# Patient Record
Sex: MALE | Race: WHITE | NOT HISPANIC OR LATINO | Employment: FULL TIME | URBAN - METROPOLITAN AREA
[De-identification: names, ages, dates, MRNs, and addresses within clinical notes are randomized per-mention and may not be internally consistent; named-entity substitution may affect disease eponyms.]

---

## 2017-11-03 ENCOUNTER — HOSPITAL ENCOUNTER (OUTPATIENT)
Dept: URGENT CARE | Facility: CLINIC | Age: 42
Setting detail: SPECIMEN
Discharge: HOME | End: 2017-11-03
Attending: EMERGENCY MEDICINE | Admitting: EMERGENCY MEDICINE

## 2017-11-03 LAB
BASOPHILS # BLD AUTO: 0 10*3/UL (ref 0–0.2)
BASOPHILS NFR BLD AUTO: 1 % (ref 0–2)
DIFFERENTIAL METHOD BLD: (no result)
EOSINOPHIL # BLD AUTO: 0.1 10*3/UL (ref 0–0.3)
EOSINOPHIL # BLD AUTO: 2 % (ref 0–3)
ERYTHROCYTE [DISTWIDTH] IN BLOOD BY AUTOMATED COUNT: 13.1 % (ref 11.5–14.5)
HCT VFR BLD AUTO: 51.3 % (ref 40–54)
HGB BLD-MCNC: 17.5 G/DL (ref 14–18)
LYMPHOCYTES # BLD AUTO: 1.1 10*3/UL (ref 0.8–4.8)
LYMPHOCYTES NFR BLD AUTO: 15 % (ref 18–42)
MCH RBC QN AUTO: 31.2 PG (ref 26–32)
MCHC RBC AUTO-ENTMCNC: 34 G/DL (ref 32–36)
MCV RBC AUTO: 91.8 FL (ref 80–94)
MONOCYTES # BLD AUTO: 0.8 10*3/UL (ref 0.1–1.3)
MONOCYTES NFR BLD AUTO: 10 % (ref 2–11)
NEUTROPHILS # BLD AUTO: 5.7 10*3/UL (ref 2.3–8.6)
NEUTROPHILS NFR BLD AUTO: 72 % (ref 50–75)
NRBC BLD AUTO-RTO: 0 /100{WBCS}
NRBC/RBC NFR BLD MANUAL: 0 10*3/UL
PLATELET # BLD AUTO: 142 10*3/UL (ref 150–450)
PMV BLD AUTO: 8.7 FL (ref 7.4–10.4)
RBC # BLD AUTO: 5.59 10*6/UL (ref 4.6–6)
WBC # BLD AUTO: 7.8 10*3/UL (ref 4.5–11.5)

## 2022-12-30 ENCOUNTER — APPOINTMENT (OUTPATIENT)
Dept: CT IMAGING | Facility: HOSPITAL | Age: 47
End: 2022-12-30
Payer: COMMERCIAL

## 2022-12-30 ENCOUNTER — HOSPITAL ENCOUNTER (EMERGENCY)
Facility: HOSPITAL | Age: 47
Discharge: HOME | End: 2022-12-30
Attending: EMERGENCY MEDICINE | Admitting: EMERGENCY MEDICINE
Payer: COMMERCIAL

## 2022-12-30 VITALS
HEIGHT: 71 IN | RESPIRATION RATE: 18 BRPM | TEMPERATURE: 97.8 F | WEIGHT: 195 LBS | OXYGEN SATURATION: 96 % | HEART RATE: 79 BPM | DIASTOLIC BLOOD PRESSURE: 75 MMHG | SYSTOLIC BLOOD PRESSURE: 122 MMHG | BODY MASS INDEX: 27.3 KG/M2

## 2022-12-30 DIAGNOSIS — K76.0 FATTY LIVER: ICD-10-CM

## 2022-12-30 DIAGNOSIS — N20.0 RENAL STONE: ICD-10-CM

## 2022-12-30 DIAGNOSIS — S32.009A CLOSED FRACTURE OF TRANSVERSE PROCESS OF LUMBAR VERTEBRA, INITIAL ENCOUNTER: Primary | ICD-10-CM

## 2022-12-30 LAB
ANION GAP SERPL CALCULATED.3IONS-SCNC: 8.2 MMOL/L (ref 5–15)
BASOPHILS # BLD AUTO: 0.02 10*3/MM3 (ref 0–0.2)
BASOPHILS NFR BLD AUTO: 0.3 % (ref 0–1.5)
BILIRUB UR QL STRIP: NEGATIVE
BUN SERPL-MCNC: 19 MG/DL (ref 6–20)
BUN/CREAT SERPL: 15.8 (ref 7–25)
CALCIUM SPEC-SCNC: 8.8 MG/DL (ref 8.6–10.5)
CHLORIDE SERPL-SCNC: 109 MMOL/L (ref 98–107)
CLARITY UR: CLEAR
CO2 SERPL-SCNC: 26.8 MMOL/L (ref 22–29)
COLOR UR: YELLOW
CREAT SERPL-MCNC: 1.2 MG/DL (ref 0.76–1.27)
DEPRECATED RDW RBC AUTO: 45.1 FL (ref 37–54)
EGFRCR SERPLBLD CKD-EPI 2021: 74.6 ML/MIN/1.73
EOSINOPHIL # BLD AUTO: 0.06 10*3/MM3 (ref 0–0.4)
EOSINOPHIL NFR BLD AUTO: 0.8 % (ref 0.3–6.2)
ERYTHROCYTE [DISTWIDTH] IN BLOOD BY AUTOMATED COUNT: 13.7 % (ref 12.3–15.4)
GLUCOSE SERPL-MCNC: 104 MG/DL (ref 65–99)
GLUCOSE UR STRIP-MCNC: NEGATIVE MG/DL
HCT VFR BLD AUTO: 45.1 % (ref 37.5–51)
HGB BLD-MCNC: 15.6 G/DL (ref 13–17.7)
HGB UR QL STRIP.AUTO: NEGATIVE
KETONES UR QL STRIP: NEGATIVE
LEUKOCYTE ESTERASE UR QL STRIP.AUTO: NEGATIVE
LYMPHOCYTES # BLD AUTO: 1.8 10*3/MM3 (ref 0.7–3.1)
LYMPHOCYTES NFR BLD AUTO: 24.5 % (ref 19.6–45.3)
MCH RBC QN AUTO: 31.3 PG (ref 26.6–33)
MCHC RBC AUTO-ENTMCNC: 34.6 G/DL (ref 31.5–35.7)
MCV RBC AUTO: 90.6 FL (ref 79–97)
MONOCYTES # BLD AUTO: 0.6 10*3/MM3 (ref 0.1–0.9)
MONOCYTES NFR BLD AUTO: 8.2 % (ref 5–12)
NEUTROPHILS NFR BLD AUTO: 4.76 10*3/MM3 (ref 1.7–7)
NEUTROPHILS NFR BLD AUTO: 64.7 % (ref 42.7–76)
NITRITE UR QL STRIP: NEGATIVE
PH UR STRIP.AUTO: 6 [PH] (ref 5–8)
PLATELET # BLD AUTO: 114 10*3/MM3 (ref 140–450)
PMV BLD AUTO: 10.7 FL (ref 6–12)
POTASSIUM SERPL-SCNC: 3.9 MMOL/L (ref 3.5–5.2)
PROT UR QL STRIP: NEGATIVE
RBC # BLD AUTO: 4.98 10*6/MM3 (ref 4.14–5.8)
SODIUM SERPL-SCNC: 144 MMOL/L (ref 136–145)
SP GR UR STRIP: 1.02 (ref 1–1.03)
UROBILINOGEN UR QL STRIP: NORMAL
WBC NRBC COR # BLD: 7.35 10*3/MM3 (ref 3.4–10.8)

## 2022-12-30 PROCEDURE — 25010000002 IOPAMIDOL 61 % SOLUTION: Performed by: PHYSICIAN ASSISTANT

## 2022-12-30 PROCEDURE — 85025 COMPLETE CBC W/AUTO DIFF WBC: CPT | Performed by: PHYSICIAN ASSISTANT

## 2022-12-30 PROCEDURE — 80048 BASIC METABOLIC PNL TOTAL CA: CPT | Performed by: PHYSICIAN ASSISTANT

## 2022-12-30 PROCEDURE — 74177 CT ABD & PELVIS W/CONTRAST: CPT

## 2022-12-30 PROCEDURE — 81003 URINALYSIS AUTO W/O SCOPE: CPT | Performed by: PHYSICIAN ASSISTANT

## 2022-12-30 PROCEDURE — 99284 EMERGENCY DEPT VISIT MOD MDM: CPT

## 2022-12-30 PROCEDURE — 99283 EMERGENCY DEPT VISIT LOW MDM: CPT

## 2022-12-30 RX ORDER — HYDROCODONE BITARTRATE AND ACETAMINOPHEN 5; 325 MG/1; MG/1
1 TABLET ORAL ONCE
Status: COMPLETED | OUTPATIENT
Start: 2022-12-30 | End: 2022-12-30

## 2022-12-30 RX ORDER — LIDOCAINE 50 MG/G
1 PATCH TOPICAL EVERY 24 HOURS
Qty: 30 PATCH | Refills: 0 | Status: SHIPPED | OUTPATIENT
Start: 2022-12-30

## 2022-12-30 RX ORDER — IBUPROFEN 600 MG/1
600 TABLET ORAL EVERY 6 HOURS PRN
Qty: 30 TABLET | Refills: 0 | Status: SHIPPED | OUTPATIENT
Start: 2022-12-30

## 2022-12-30 RX ORDER — HYDROCODONE BITARTRATE AND ACETAMINOPHEN 5; 325 MG/1; MG/1
1 TABLET ORAL EVERY 6 HOURS PRN
Qty: 12 TABLET | Refills: 0 | Status: SHIPPED | OUTPATIENT
Start: 2022-12-30 | End: 2023-01-10

## 2022-12-30 RX ADMIN — HYDROCODONE BITARTRATE AND ACETAMINOPHEN 1 TABLET: 5; 325 TABLET ORAL at 10:47

## 2022-12-30 RX ADMIN — IOPAMIDOL 85 ML: 612 INJECTION, SOLUTION INTRAVENOUS at 11:26

## 2022-12-30 NOTE — DISCHARGE INSTRUCTIONS
You have been given emergency department evaluation.  This evaluation is intended to rule out life-threatening conditions.  Is not a complete evaluation.  You could require further testing as determined by your primary care physician or any referred specialist.  Please follow-up with all doctors that you are referred to.  Please be sure to take your prescribed medications and follow any specific instructions in the discharge instructions.  Please follow-up with your primary care physician within 48 hours.  Please have your primary care provider recheck your blood pressure.  Please return to the emergency department if you experience chest pain, shortness of breath, abdominal pain, fever greater than 102, intractable vomiting.  Please return to the emergency department if your symptoms continue or worsen, or if you begin to experience any other concerning symptom.

## 2022-12-30 NOTE — ED PROVIDER NOTES
MD ATTESTATION NOTE  I wore full protective equipment throughout this patient encounter including an N95 face mask, googles, gown and gloves. Hand hygiene was performed before donning protective equipment and after removal when leaving the room.    The MONRO[facility] and I have discussed this patient's history, physical exam, and treatment plan. I have reviewed the documentation and personally had a face to face interaction with the patient. I affirm the [facility]RO[facility] documentation and agree with their diagnostics, findings, treatment, plan, and disposition.    I provided a substantive portion of the care of this patient.  I personally performed the physical exam, in its entirety.  The attached note describes my personal findings.    Alberto Hidalgo is a 47 y.o. male who presents to the ED c/o back pain.  Patient reports that he fell while walking down the steps Tuesday, landed on his back.  Patient reports that he was having upper and lower back pain but upper back pain has since resolved.  Patient complains of sharp well localized sharp back pain in his lumbar region, greater on the left side.  Patient reports that he was having some radiation into his leg but after being seen at urgent care and prescribed muscle relaxers as well as receiving a steroid shot radicular pain has resolved.  Patient reports pain is worse with movement, improved with rest.  Patient reports pain is worsened with sitting position.  Patient denies any radicular pain recently, denies any weakness or numbness.    On exam:  General: NAD.  Head: NCAT.  ENT: nares patent, no scleral icterus  Neck: Supple, trachea midline.  Cardiac: regular rate and rhythm.  Lungs: normal effort.  Abdomen: Soft, NTTP.   Lumbar spine: No step-offs or deformities, no midline tenderness to palpation, positive bilateral paraspinal tenderness much greater on the left in the mid lumbar region.  Bilateral lower extremities: Negative straight leg raise.  5 out of 5 strength.  Sensation  intact light touch.  2+ reflexes. No clonus.  Negative Babinski sign.  Extremities: Moves all extremities well, no peripheral edema  Neuro: alert, MAEW, follows commands  Psych: calm, cooperative  Skin: Warm, dry.    Medical Decision Making:  After the initial H&P, I discussed pertinent information from history and physical exam with patient/family.  Discussed differential diagnosis.  Discussed plan for ED evaluation/work-up/treatment.  All questions answered.  Patient/family is agreeable with plan.    ED Course as of 12/30/22 1216   Fri Dec 30, 2022   1058 Blood, UA: Negative [DC]   1211 MARISELA query complete. Treatment plan to include limited course of prescribed  controlled substance. Risks including addiction, benefits, and alternatives presented to patient.    [JG]      ED Course User Index  [DC] Sneha Nath PA  [JG] Demian Alamo MD       Diagnosis  Final diagnoses:   Closed fracture of transverse process of lumbar vertebra, initial encounter (HCC)   Renal stone   Fatty liver        Demian Alamo MD  12/30/22 1216

## 2022-12-30 NOTE — ED NOTES
Pt arrived by PV reports left side flank pain, hx of kidney stones. Fell down steps on Tuesday night, denies hitting head, denies LOC, hit back seen at [facility] Wednesday morning with neg xray

## 2022-12-31 NOTE — ED PROVIDER NOTES
EMERGENCY DEPARTMENT ENCOUNTER    Room Number:  24/24  Date seen:  12/30/2022  PCP: Provider, No Known  Historian: Patient      HPI:  Chief Complaint: Flank pain  A complete HPI/ROS/PMH/PSH/SH/FH are unobtainable due to: None  Context: Alberto Hidalgo is a 47 y.o. male who presents to the ED c/o left flank pain that began a couple days ago after slipping and falling while going down some steps at home.  He believes he hit his back on a step when he fell.  He was seen at urgent care the next day and had x-rays which were negative.  He was started on muscle relaxer and steroid but states he has not had any improvement of symptoms.  The pain is worse with movement and is better with rest.  He denies any paresthesias, extremity weakness, abdominal pain, difficulty urinating, gross hematuria.            PAST MEDICAL HISTORY  Active Ambulatory Problems     Diagnosis Date Noted   • No Active Ambulatory Problems     Resolved Ambulatory Problems     Diagnosis Date Noted   • No Resolved Ambulatory Problems     Past Medical History:   Diagnosis Date   • Kidney stones    • Sinus drainage          PAST SURGICAL HISTORY  Past Surgical History:   Procedure Laterality Date   • DENTAL PROCEDURE     • HAND SURGERY Right          FAMILY HISTORY  Family History   Problem Relation Age of Onset   • No Known Problems Mother    • No Known Problems Father          SOCIAL HISTORY  Social History     Socioeconomic History   • Marital status:    Tobacco Use   • Smoking status: Never   • Smokeless tobacco: Never   Substance and Sexual Activity   • Alcohol use: Yes     Alcohol/week: 24.0 standard drinks     Types: 24 Cans of beer per week   • Drug use: No         ALLERGIES  Penicillin g        REVIEW OF SYSTEMS  Review of Systems   Constitutional: Negative for chills and fever.   Respiratory: Negative for shortness of breath.    Cardiovascular: Negative for chest pain.   Gastrointestinal: Negative for abdominal pain, nausea and vomiting.    Genitourinary: Positive for flank pain. Negative for difficulty urinating, dysuria and hematuria.   Musculoskeletal: Positive for back pain. Negative for neck pain.        PHYSICAL EXAM  ED Triage Vitals   Temp Heart Rate Resp BP SpO2   12/30/22 0954 12/30/22 0953 12/30/22 0953 12/30/22 1032 12/30/22 0953   97.8 °F (36.6 °C) 100 18 137/97 98 %      Temp src Heart Rate Source Patient Position BP Location FiO2 (%)   12/30/22 0954 12/30/22 0953 -- -- --   Tympanic Monitor          Physical Exam      GENERAL: Nontoxic, no acute distress  HENT: nares patent  EYES: no scleral icterus  CV: regular rhythm, normal rate  RESPIRATORY: normal effort  ABDOMEN: soft, nontender  MUSCULOSKELETAL: no deformity, area of swelling and faint ecchymosis to the upper lumbar left paraspinal region that is tender to palpation, no midline tenderness to thoracic or lumbar spine  NEURO: alert, moves all extremities, follows commands  PSYCH:  calm, cooperative  SKIN: warm, dry    Vital signs and nursing notes reviewed.          LAB RESULTS  No results found for this or any previous visit (from the past 24 hour(s)).    Ordered the above labs and reviewed the results.        RADIOLOGY  No Radiology Exams Resulted Within Past 24 Hours    Ordered the above noted radiological studies. Reviewed by me in PACS.            PROCEDURES  Procedures        MEDICATIONS GIVEN IN ER  Medications   HYDROcodone-acetaminophen (NORCO) 5-325 MG per tablet 1 tablet (1 tablet Oral Given 12/30/22 1047)   iopamidol (ISOVUE-300) 61 % injection 100 mL (85 mL Intravenous Given by Other 12/30/22 1126)                   MEDICAL DECISION MAKING, PROGRESS, and CONSULTS    All labs have been independently reviewed by me.  All radiology studies have been reviewed by me and I have also reviewed the radiology report.   EKG's independently viewed and interpreted by me.  Discussion below represents my analysis of pertinent findings related to patient's condition, differential  diagnosis, treatment plan and final disposition.      Additional sources:    - External (non-ED) record review: Reviewed urgent care visit on 12/28.      Orders placed during this visit:  Orders Placed This Encounter   Procedures   • CT Abdomen Pelvis With Contrast   • Basic Metabolic Panel   • Urinalysis With Microscopic If Indicated (No Culture) - Urine, Clean Catch   • CBC Auto Differential   • CBC & Differential         Differential diagnosis:    My differential diagnosis for back pain includes but is not limited to:  Musculoskeletal strain, contusion, retroperitoneal hematoma, disc protrusion, vertebral fracture, transverse process fracture, rib fracture, facet syndrome, sacroiliac joint strain, sciatica, renal injury, splenic injury, pancreatic injury, osteoarthritis, lumbar spondylosis, spinal stenosis, ankylosing spondylitis, sacroiliac joint inflammation, pancreatitis, perforated peptic ulcer, diverticulitis, epidural abscess, osteomyelitis, retroperitoneal abscess, pyelonephritis, pneumonia, subphrenic abscess, tuberculosis, neurofibroma, meningioma, multiple myeloma, lymphoma, metastatic cancer, primary cancer, AAA, aortic dissection, spinal ischemia, referred pain, ureterolithiasis        Independent interpretation of labs, radiology studies, and discussions with consultants:  ED Course as of 12/31/22 1614   Fri Dec 30, 2022   1058 Blood, UA: Negative [DC]   1211 MARISELA query complete. Treatment plan to include limited course of prescribed  controlled substance. Risks including addiction, benefits, and alternatives presented to patient.    [JG]      ED Course User Index  [DC] Sneha Nath PA  [JG] Demian Alamo MD     Pt presents with left back/flank pain after fall. Xray on 12/28 at urgent care negative for fracture. Steroids and muscle relaxers have provided little relief. Bruising noted to left lower thoracic/upper lumbar paraspinal area. CT imaging obtained for further evaluation of spine and  evaluate for any retroperitoneal injury.         Patient was placed in face mask in first look. Patient was wearing facemask when I entered the room and throughout our encounter. I wore full protective equipment throughout this patient encounter including a face mask, and gloves. Hand hygiene was performed before donning protective equipment and after removal when leaving the room.      DIAGNOSIS  Final diagnoses:   Closed fracture of transverse process of lumbar vertebra, initial encounter (HCC)   Renal stone   Fatty liver         DISPOSITION  Discharge            Latest Documented Vital Signs:  As of 16:14 EST  BP- 122/75 HR- 79 Temp- 97.8 °F (36.6 °C) (Tympanic) O2 sat- 96%              --    Please note that portions of this were completed with a voice recognition program.       Note Disclaimer: At [facility], we believe that sharing information builds trust and better relationships. You are receiving this note because you are receiving care at [facility] or recently visited. It is possible you will see health information before a provider has talked with you about it. This kind of information can be easy to misunderstand. To help you fully understand what it means for your health, we urge you to discuss this note with your provider.           Sneha Nath PA  12/31/22 4465

## 2023-01-05 ENCOUNTER — TELEPHONE (OUTPATIENT)
Dept: NEUROSURGERY | Facility: CLINIC | Age: 48
End: 2023-01-05
Payer: COMMERCIAL

## 2023-01-05 DIAGNOSIS — S32.009A CLOSED FRACTURE OF TRANSVERSE PROCESS OF LUMBAR VERTEBRA, INITIAL ENCOUNTER: Primary | ICD-10-CM

## 2023-01-05 NOTE — TELEPHONE ENCOUNTER
Discussed with Dr. Oakes who recommended a TLSO brace for his transverse compression fractures.  I called and spoke with Aguila hassan who stated he would deliver the TLSO brace to Mr. Hidalgo, at his house.  I then called and spoke with Mr. Hidalgo who stated that he is continuing to have pain in certain ways he moves increases the pain.  I let him know that I did order a TLSO brace for him that should help restrict his movement, and that he needs to wear this brace whenever he is out of bed.  He states that he understands, and he agrees with the plan.  He has a follow-up appointment with Sade on Tuesday of next week.  I did explain that if he continues to have increased pain, or any concerns that we can schedule him an appointment tomorrow if he calls us and let us know.

## 2023-01-09 NOTE — PROGRESS NOTES
Subjective   Patient ID: Alberto Hidalgo is a 47 y.o. male is being seen for consultation today at the request of Demian Alamo MD for closed fracture of transverse process of lumbar vertebra. He has a recent lumbar XR preformed on 12/8/2022 with Nabeel. He reports he is having constant slight pain that increases with certain activities and bending. He reports he does feel the pain is improving though. He denies any leg pain. He denies any numbness or tingling. He denies any weakness. He denies any bowel or bladder incotnience. He reports he is taking tylenol or ibuprofen for pain.     History of Present Illness    47-year-old male who presents for a follow-up on left L2, L3 transverse process fractures.  He was seen in the ER after having significant back pain a few days following a fall.  Today he states that he rarely has back pain and only has mild back pain with certain activities.  He denies any numbness/tingling, pain, weakness in his bilateral lower extremities.  Denies bowel/bladder incontinence.  Denies saddle numbness.  States that he was initially taking Norco for the pain but is now gotten to the point where he is only taking ibuprofen or Tylenol as needed.  He was given a TLSO brace as he was having significant pain and states he is wearing this at all times when out of bed.    Review of Systems   Constitutional: Positive for activity change.   HENT: Negative for congestion.    Eyes: Negative for visual disturbance.   Respiratory: Negative for chest tightness and shortness of breath.    Cardiovascular: Negative for chest pain.   Gastrointestinal: Negative for nausea and vomiting.   Endocrine: Negative for cold intolerance and heat intolerance.   Genitourinary: Negative for difficulty urinating.   Musculoskeletal: Positive for back pain.   Skin: Negative for rash and wound.   Allergic/Immunologic: Positive for environmental allergies.   Neurological: Negative for weakness and numbness.    Hematological: Bruises/bleeds easily.   Psychiatric/Behavioral: Negative for sleep disturbance.       Tobacco Use: Low Risk    • Smoking Tobacco Use: Never   • Smokeless Tobacco Use: Never   • Passive Exposure: Not on file     Alberto Hidalgo  reports that he has never smoked. He has never used smokeless tobacco.    Objective     Vitals:    01/10/23 1327   BP: 140/100   BP Location: Right arm   Patient Position: Sitting   Cuff Size: Adult   Pulse: 92   Temp: 97.5 °F (36.4 °C)   TempSrc: Infrared   SpO2: 98%   Weight: 88.5 kg (195 lb)   Height: 180.3 cm (71\")     Body mass index is 27.2 kg/m².      Physical Exam  Vitals reviewed.   Constitutional:       General: He is not in acute distress.     Appearance: Normal appearance. He is normal weight. He is not ill-appearing, toxic-appearing or diaphoretic.   HENT:      Head: Normocephalic and atraumatic.   Pulmonary:      Effort: Pulmonary effort is normal. No respiratory distress.   Neurological:      Mental Status: He is alert and oriented to person, place, and time.      Motor: Motor strength is normal.      Deep Tendon Reflexes:      Reflex Scores:       Patellar reflexes are 2+ on the right side and 2+ on the left side.       Achilles reflexes are 1+ on the right side and 1+ on the left side.  Psychiatric:         Mood and Affect: Mood normal.         Speech: Speech normal.         Behavior: Behavior normal.         Thought Content: Thought content normal.         Judgment: Judgment normal.       Neurologic Exam     Mental Status   Oriented to person, place, and time.   Attention: normal. Concentration: normal.   Speech: speech is normal   Level of consciousness: alert  Knowledge: good.   Normal comprehension.     Motor Exam   Muscle bulk: normal  Overall muscle tone: normal  Right arm tone: normal  Left arm tone: normal  Right leg tone: normal  Left leg tone: normal    Strength   Strength 5/5 throughout.     Sensory Exam   Light touch normal.     Gait,  Coordination, and Reflexes     Reflexes   Right patellar: 2+  Left patellar: 2+  Right achilles: 1+  Left achilles: 1+  Right ankle clonus: absent          Assessment & Plan   Independent Review of Radiographic Studies:      I personally reviewed the images from the following studies. Agree with radiology    CT Abdomen Pelvis With Contrast    Result Date: 2022  1. Acute fractures of the left L2 and L3 transverse processes. 2. 5 x 4 mm right lower pole renal nonobstructing stone. 3. Degenerative disc disease at L5-S1. 4. Mild diffuse fat infiltration of the liver.  Radiation dose reduction techniques were utilized, including automated exposure control and exposure modulation based on body size.  This report was finalized on 2022 12:42 PM by Dr. Venkata Leonard M.D.          Medical Decision Makin-year-old male who presents for follow-up on L, L2-3 transverse process fractures.  He is recovering well.  States he has some minimal pain with movement.  He was ordered a TLSO brace for comfort.  He does not have any red flag symptoms of cauda equina syndrome.  States he is wearing the TLSO brace mostly as a reminder.  Since his pain is improving and he does not have any radicular symptoms in his legs, I told him he can continue to wear the brace for comfort over the next 4 weeks.  At that time, he will be approximately 6 weeks out from his ER visit and we can evaluate lumbar x-rays and potentially have him follow-up on an as-needed basis at that point if he is doing well.    Diagnoses and all orders for this visit:    1. Closed fracture of transverse process of lumbar vertebra, initial encounter (HCC) (Primary)      Return in about 4 weeks (around 2023) for Follow-up after imaging.

## 2023-01-10 ENCOUNTER — OFFICE VISIT (OUTPATIENT)
Dept: NEUROSURGERY | Facility: CLINIC | Age: 48
End: 2023-01-10
Payer: COMMERCIAL

## 2023-01-10 VITALS
HEART RATE: 92 BPM | WEIGHT: 195 LBS | OXYGEN SATURATION: 98 % | TEMPERATURE: 97.5 F | BODY MASS INDEX: 27.3 KG/M2 | SYSTOLIC BLOOD PRESSURE: 140 MMHG | DIASTOLIC BLOOD PRESSURE: 100 MMHG | HEIGHT: 71 IN

## 2023-01-10 DIAGNOSIS — S32.009A CLOSED FRACTURE OF TRANSVERSE PROCESS OF LUMBAR VERTEBRA, INITIAL ENCOUNTER: Primary | ICD-10-CM

## 2023-01-10 PROCEDURE — 99203 OFFICE O/P NEW LOW 30 MIN: CPT

## 2023-01-13 ENCOUNTER — PATIENT ROUNDING (BHMG ONLY) (OUTPATIENT)
Dept: NEUROSURGERY | Facility: CLINIC | Age: 48
End: 2023-01-13
Payer: COMMERCIAL

## 2023-01-31 ENCOUNTER — TELEPHONE (OUTPATIENT)
Dept: NEUROSURGERY | Facility: CLINIC | Age: 48
End: 2023-01-31

## 2023-01-31 NOTE — TELEPHONE ENCOUNTER
Caller: Alberto Hidalgo    Relationship to patient: Self    Best call back number: [phone]    Patient is needing:   PATIENT CALLED ASKING TO BE ADDED TO LIST TO SEE APC FOR February    PLEASE CALL TO SCHEDULE

## 2023-02-06 ENCOUNTER — TELEPHONE (OUTPATIENT)
Dept: NEUROSURGERY | Facility: CLINIC | Age: 48
End: 2023-02-06
Payer: COMMERCIAL

## 2023-02-06 NOTE — TELEPHONE ENCOUNTER
Please schedule f/up appointment with Sade NORIEGA, or someone next week.  He needs a f/up with imaging.  The imaging is scheduled.  His family member contacted me b/c he said he was having trouble getting an appointment.   Thank you!

## 2023-02-06 NOTE — TELEPHONE ENCOUNTER
----- Message from JANELLE Armando sent at 2/3/2023 12:34 PM EST -----  Regarding: f/up  Do you care to see about making a f/up appointment with Sade NORIEGA, or someone next week.  He needs a f/up with imaging.  The imaging is scheduled.  His family member contacted me b/c he said he was having trouble getting an appointment. I appreciate any help.  Thank you!

## 2023-04-20 NOTE — PROGRESS NOTES
Subjective   Patient ID: Alberto Hidalgo is a 47 y.o. male is here today for follow-up.     Today, Alberto Hidalgo lower back pain without radiating pain, numbness and tingling down bilateral legs.  He denies loss of bowel/bladder incontinence. additionally he reports mid back up to neck without radiating numbness and tingling down bilateral arms. He reports intermittent range of motion with his neck.    He reports no use of ice/heat.  He reports not current with physical therapy and pain management.     History of Present Illness     He returns to the office today for follow-up after sustaining L2 and L3 transverse process fractures in December 2022.  He has had no new imaging.  He was treated conservatively in a TLSO brace for comfort.      /80   Pulse 76   SpO2 96%       The following portions of the patient's history were reviewed and updated as appropriate: allergies, current medications, past family history, past medical history, past social history, past surgical history and problem list.    Review of Systems   Constitutional: Positive for activity change.   HENT: Negative for trouble swallowing.    Eyes: Negative.    Respiratory: Negative for chest tightness and shortness of breath.    Gastrointestinal: Negative for constipation.   Genitourinary: Negative for difficulty urinating and enuresis.   Musculoskeletal: Positive for back pain, neck pain and neck stiffness. Negative for gait problem.   Neurological: Negative for dizziness, weakness, numbness and headaches.   Psychiatric/Behavioral: Negative for sleep disturbance.           Objective     Vitals:    04/25/23 1010   BP: 128/80   Pulse: 76   SpO2: 96%     There is no height or weight on file to calculate BMI.    Tobacco Use: Low Risk    • Smoking Tobacco Use: Never   • Smokeless Tobacco Use: Never   • Passive Exposure: Not on file          Physical Exam  Vitals reviewed.   Constitutional:       Appearance: Normal appearance.   HENT:      Head:  Atraumatic.   Eyes:      Comments: Corrective lenses   Pulmonary:      Effort: Pulmonary effort is normal.   Musculoskeletal:         General: No swelling or tenderness. Normal range of motion.      Cervical back: Normal range of motion and neck supple. No tenderness.   Skin:     General: Skin is warm and dry.   Neurological:      Mental Status: He is alert and oriented to person, place, and time.      GCS: GCS eye subscore is 4. GCS verbal subscore is 5. GCS motor subscore is 6.      Motor: Motor function is intact. No weakness.      Gait: Gait is intact.   Psychiatric:         Mood and Affect: Mood normal.       Neurologic Exam     Mental Status   Oriented to person, place, and time.     Gait, Coordination, and Reflexes     Gait  Gait: normal          Assessment & Plan   Independent Review of Radiographic Studies:      I personally reviewed the images from the following studies.    No new imaging    Medical Decision Making:    He returns to the office today for ongoing follow-up with history of fall and lumbar transverse process fractures.  Exam as noted above, no red flags.  It has now been 3 months since the patient was initially seen.  He is nontender to firm palpation in his upper, lower and mid back.  He does have some intermittent muscular tightness with activity.  He is been very cautious with activity level since his fall back in December.  He would like to start physical therapy to help with reconditioning.  From our standpoint he is stable and needs no additional imaging.  There are no concerns on his exam from my standpoint.  We will see him back in the office on an as-needed basis.  Take NSAIDs as needed.    Plan: Referral to physical therapy, follow-up as needed      Diagnoses and all orders for this visit:    1. Closed fracture of transverse process of lumbar vertebra with routine healing, subsequent encounter (Primary)  -     Ambulatory Referral to Physical Therapy Evaluate and treat; Stretching,  ROM, Strengthening; Full weight bearing      Return if symptoms worsen or fail to improve.

## 2023-04-24 ENCOUNTER — TELEPHONE (OUTPATIENT)
Dept: NEUROSURGERY | Facility: CLINIC | Age: 48
End: 2023-04-24
Payer: COMMERCIAL

## 2023-04-24 NOTE — TELEPHONE ENCOUNTER
Patient wants to know why he has to have another xray before his visit.  The last Xray did not show the fx but the CT did.  Please advise and call patient back

## 2023-04-25 ENCOUNTER — OFFICE VISIT (OUTPATIENT)
Dept: NEUROSURGERY | Facility: CLINIC | Age: 48
End: 2023-04-25
Payer: COMMERCIAL

## 2023-04-25 VITALS — DIASTOLIC BLOOD PRESSURE: 80 MMHG | HEART RATE: 76 BPM | SYSTOLIC BLOOD PRESSURE: 128 MMHG | OXYGEN SATURATION: 96 %

## 2023-04-25 DIAGNOSIS — S32.009D CLOSED FRACTURE OF TRANSVERSE PROCESS OF LUMBAR VERTEBRA WITH ROUTINE HEALING, SUBSEQUENT ENCOUNTER: Primary | ICD-10-CM

## 2023-04-25 PROBLEM — S32.009A CLOSED FRACTURE OF TRANSVERSE PROCESS OF LUMBAR VERTEBRA: Status: ACTIVE | Noted: 2023-04-25
